# Patient Record
(demographics unavailable — no encounter records)

---

## 2024-10-11 NOTE — ASSESSMENT
[FreeTextEntry1] : 72 y/o male with CAD.  CAD, s/p PCI, now with  of LAD, unsuccessful PCI in 2010 No angina Recommend medical therapy for CAD ASA 81 mg C/w statin - LDL is controlled C/w ACE-I Metoprolol 25 mg QD DM control. Goal of A1c below 7 discussed. Last A1c 7.5 - f/u with Dr. Amaro. DONN - c/w CPAP Weight loss recommended. Repeat labs with Dr. Amaro 2D echo reviewed. Patient was advised about healthy lifestyle changes, including diet and exercise. Importance of sustained long-term weight loss was discussed, questions answered.  F/u in 6 months.

## 2024-10-11 NOTE — HISTORY OF PRESENT ILLNESS
[FreeTextEntry1] : 72 y/o male with history of HTN, DL, DM, CAD, s/p PCI of LAD in 2006, stent was occluded on repeat cath in 2010 with collateral flow. Anterior defect on nuclear stress test, c/w his CAD. The occlusion was a , was not amenable to angioplasty and he was treated medically. He reports no chest pain, dyspnea, orthopnea or PND. Quit tobacco in 2006. Following with pulmonary for COPD. Gained weight. Elevated glucose and LDL on pre-treatment labs started on statin and DM therapy. DONN - on CPAP - compliant. Has pain in the neck and right shoulder - arthritis changed on imaging, cervical radiculopathy - following with ortho. Echo in April 2024 - preserved LV function. Labs 8/2/24 - HgA1c 7.5, LDL 58,

## 2025-04-09 NOTE — HISTORY OF PRESENT ILLNESS
[FreeTextEntry1] : 73 y/o male with history of HTN, DL, DM, CAD, s/p PCI of LAD in 2006, stent was occluded on repeat cath in 2010 with collateral flow. Anterior defect on nuclear stress test, c/w his CAD. The occlusion was a , was not amenable to angioplasty and he was treated medically. He reports no chest pain, dyspnea, orthopnea or PND. Quit tobacco in 2006. Following with pulmonary for COPD. Gained weight. Elevated glucose and LDL on pre-treatment labs started on statin and DM therapy. DONN - on CPAP - compliant. Has pain in the neck and right shoulder - arthritis changed on imaging, cervical radiculopathy - following with ortho. Echo in April 2024 - preserved LV function. Labs reviewed - lipids are controlled, A1c is better. Unable to loose weight. Compliant with meds.

## 2025-04-09 NOTE — ASSESSMENT
[FreeTextEntry1] : 73 y/o male with CAD.  CAD, s/p PCI, now with  of LAD, unsuccessful PCI in 2010 No angina Recommend medical therapy for CAD ASA 81 mg C/w statin - LDL is controlled C/w ACE-I Metoprolol 25 mg QD DM control. Goal of A1c below 7 discussed. Last A1c 6.4 - f/u with Dr. Amaro. DONN - c/w CPAP Weight loss recommended. Repeat labs with Dr. Amaro 2D echo reviewed. Patient was advised about healthy lifestyle changes, including diet and exercise. Importance of sustained long-term weight loss was discussed, questions answered.  F/u in 6 months.